# Patient Record
Sex: FEMALE | Race: WHITE
[De-identification: names, ages, dates, MRNs, and addresses within clinical notes are randomized per-mention and may not be internally consistent; named-entity substitution may affect disease eponyms.]

---

## 2017-02-07 NOTE — MR
MRI of the Brain(Without and With Contrast)



Contrast: 6.5 mL intravenous Gadavist without complication.



History: Left-sided headaches, recurrent. R51



Technique: Sagittal and axial T1-weighted images. Axial fast T2 2nd echo, GRE, diffusion and FLAIR im
ages. Postgadolinium axial and coronal images.



Findings: There is a small focal  nodular area of fluid in the posterior inferior right basal ganglia
 that is consistent with an a prominent perivascular space (etat crible). A few scattered isovolumic 
T2-weighted foci of hyperintensity are present in the supratentorium and consistent with sequelae of 
prior migraine headaches. None of these are associated with hemorrhage or restricted diffusion. There
 is no evidence of mass lesion, hemorrhage, acute infarction, hydrocephalus, metastatic disease, or a
bnormal meningeal enhancement to suggest meningitis. There is no evidence of a chronic subdural hemat
shilpa. The paranasal and mastoid sinuses are normally aerated. The pituitary gland and suprasellar cist
kyle looks normal. Flow-void is present in both cavernous carotid arteries and in the basilar artery. 
No obvious aneurysm is identified around the [place]. The orbits and intraorbital contents a
ppear normal. There is mild focal mucosal thickening in the anterior left ethmoid sinus. Other sinuse
s are normally aerated. The craniocervical junction is normal.



Impression:1.  No evidence for mass, meningitis or parenchymal source for left frontal headaches.

2. Mild anterior left ethmoidal sinus disease.

## 2017-11-27 ENCOUNTER — HOSPITAL ENCOUNTER (OUTPATIENT)
Dept: HOSPITAL 80 - FIMAGING | Age: 42
End: 2017-11-27
Attending: OBSTETRICS & GYNECOLOGY
Payer: COMMERCIAL

## 2017-11-27 DIAGNOSIS — Z12.31: Primary | ICD-10-CM

## 2017-11-27 PROCEDURE — G0202 SCR MAMMO BI INCL CAD: HCPCS

## 2018-12-11 ENCOUNTER — HOSPITAL ENCOUNTER (OUTPATIENT)
Dept: HOSPITAL 80 - FIMAGING | Age: 43
End: 2018-12-11
Attending: OBSTETRICS & GYNECOLOGY
Payer: COMMERCIAL

## 2018-12-11 DIAGNOSIS — Z12.31: Primary | ICD-10-CM
